# Patient Record
Sex: FEMALE | Race: WHITE | NOT HISPANIC OR LATINO | Employment: OTHER | URBAN - METROPOLITAN AREA
[De-identification: names, ages, dates, MRNs, and addresses within clinical notes are randomized per-mention and may not be internally consistent; named-entity substitution may affect disease eponyms.]

---

## 2021-06-24 ENCOUNTER — TELEPHONE (OUTPATIENT)
Dept: ENDOCRINOLOGY | Facility: CLINIC | Age: 66
End: 2021-06-24

## 2021-06-24 NOTE — TELEPHONE ENCOUNTER
----- Message from Dave Lewis MD sent at 6/23/2021  5:08 PM EDT -----  We only got the second page of the labs.  Can you contact them to get the first page?  ----- Message -----  From: Makenzie Lundberg  Sent: 6/23/2021   4:08 PM EDT  To: Dave Lewis MD

## 2021-10-01 ENCOUNTER — LAB (OUTPATIENT)
Dept: LAB | Facility: HOSPITAL | Age: 66
End: 2021-10-01

## 2021-10-01 ENCOUNTER — OFFICE VISIT (OUTPATIENT)
Dept: ENDOCRINOLOGY | Facility: CLINIC | Age: 66
End: 2021-10-01

## 2021-10-01 VITALS
OXYGEN SATURATION: 98 % | WEIGHT: 274 LBS | SYSTOLIC BLOOD PRESSURE: 146 MMHG | HEIGHT: 66 IN | HEART RATE: 84 BPM | DIASTOLIC BLOOD PRESSURE: 88 MMHG | BODY MASS INDEX: 44.03 KG/M2

## 2021-10-01 DIAGNOSIS — E03.9 HYPOTHYROIDISM (ACQUIRED): Primary | ICD-10-CM

## 2021-10-01 DIAGNOSIS — E03.9 HYPOTHYROIDISM (ACQUIRED): ICD-10-CM

## 2021-10-01 LAB — TSH SERPL DL<=0.05 MIU/L-ACNC: 4.77 UIU/ML (ref 0.27–4.2)

## 2021-10-01 PROCEDURE — 99203 OFFICE O/P NEW LOW 30 MIN: CPT | Performed by: INTERNAL MEDICINE

## 2021-10-01 PROCEDURE — 84443 ASSAY THYROID STIM HORMONE: CPT

## 2021-10-01 NOTE — PROGRESS NOTES
"     Office Note      Date: 10/01/2021  Patient Name: Fawad Calix  MRN: [id number]  : 1955    Chief Complaint   Patient presents with   • Hypothyroidism       History of Present Illness:   Fawad Calix is a 66 y.o. female who presents for Hypothyroidism    She reports h/o hypothyroidism for about 15 years.  She has been treated with synthroid.  She is currently taking 125mcg qd.  She hasn't done well on generic T4 in the past.  She is taking the synthroid correctly.  She isn't taking any interfering meds concurrently.  She notes fatigue.  She c/o weight gain.  She noted heat intolerance.  She denies any other sxs of hypo- or hyperthyroidism at this time.  She reports h/o goiter.  She hasn't noted any change in the size of her neck.  She denies any compressive sxs.  She haasn't had any labs done recently.  She denies any biotin intake.    Subjective      Patient was born where: [place].  Facial radiation exposure: No.  High iodine intake: No  Family hx of thyroid disease: Yes, describe: father and sister with hypothyroidism.    Review of Systems:   Review of Systems   Constitutional: Positive for fatigue and unexpected weight change.   Eyes: Negative.    Respiratory: Negative.    Cardiovascular: Negative.    Gastrointestinal: Positive for constipation and diarrhea.        Heartburn/Reflux   Endocrine: Positive for heat intolerance.   Genitourinary: Negative.    Musculoskeletal: Positive for myalgias.   Skin: Negative.    Allergic/Immunologic: Negative.    Neurological: Positive for headaches.   Hematological: Negative.    Psychiatric/Behavioral: Positive for dysphoric mood.       The following portions of the patient's history were reviewed and updated as appropriate: allergies, current medications, past family history, past medical history, past social history, past surgical history and problem list.    Objective     Visit Vitals  /88   Pulse 84   Ht 167.6 cm (66\")   Wt 124 kg (274 lb)   SpO2 98% "   BMI 44.22 kg/m²       Physical Exam:  Physical Exam  Constitutional:       Appearance: She is obese.   HENT:      Head: Normocephalic and atraumatic.   Eyes:      Extraocular Movements: Extraocular movements intact.      Conjunctiva/sclera: Conjunctivae normal.      Pupils: Pupils are equal, round, and reactive to light.   Neck:      Thyroid: No thyroid mass, thyromegaly or thyroid tenderness.   Cardiovascular:      Rate and Rhythm: Normal rate and regular rhythm.      Pulses: Normal pulses.      Heart sounds: Normal heart sounds.   Pulmonary:      Effort: Pulmonary effort is normal.      Breath sounds: Normal breath sounds.   Abdominal:      General: Bowel sounds are normal.      Palpations: Abdomen is soft.   Musculoskeletal:         General: Normal range of motion.      Cervical back: Normal range of motion and neck supple.   Lymphadenopathy:      Cervical: No cervical adenopathy.   Skin:     General: Skin is warm and dry.   Neurological:      General: No focal deficit present.      Mental Status: She is alert.   Psychiatric:         Mood and Affect: Mood normal.         Behavior: Behavior normal.         Thought Content: Thought content normal.         Judgment: Judgment normal.         Labs:    TSH  No results found for: TSHBASE     Free T4  Free T4   Date Value Ref Range Status   02/04/2016 1.03 0.89 - 1.76 ng/dL Final       T3  No results found for: G3WFWBI      TPO  No results found for: THYROIDAB    TG AB  No results found for: THGAB    TG  No results found for: THYROGLB    CBC w/DIFF  Lab Results   Component Value Date    WBC 5.85 02/04/2016    RBC 4.66 02/04/2016    HGB 13.8 02/04/2016    HCT 41.3 02/04/2016    MCV 88.6 02/04/2016    MCH 29.6 02/04/2016    MCHC 33.4 02/04/2016     02/04/2016    NEUTRORELPCT 62.4 02/04/2016    LYMPHORELPCT 30.9 02/04/2016    MONORELPCT 6.3 02/04/2016    EOSRELPCT 0.0 02/04/2016    BASORELPCT 0.2 02/04/2016    NEUTROABS 3.65 02/04/2016    LYMPHSABS 1.81 02/04/2016     MONOSABS 0.37 02/04/2016    EOSABS 0.00 (L) 02/04/2016    BASOSABS 0.01 02/04/2016    NRBC 0.0 02/04/2016           Assessment / Plan      Assessment & Plan:  Diagnoses and all orders for this visit:    1. Hypothyroidism (acquired) (Primary)  Assessment & Plan:  She has hypothyroidism.  She is on synthroid.  She reports not doing well with generic T4 in the past.  Check TSH today.    Orders:  -     TSH; Future       Return in about 6 months (around 4/1/2022) for Recheck.    Dave Lewis MD   10/01/2021

## 2021-10-01 NOTE — ASSESSMENT & PLAN NOTE
She has hypothyroidism.  She is on synthroid.  She reports not doing well with generic T4 in the past.  Check TSH today.

## 2021-10-03 RX ORDER — LEVOTHYROXINE SODIUM 137 MCG
137 TABLET ORAL DAILY
Qty: 90 TABLET | Refills: 3 | Status: SHIPPED | OUTPATIENT
Start: 2021-10-03 | End: 2022-01-25 | Stop reason: DRUGHIGH

## 2022-01-24 ENCOUNTER — LAB (OUTPATIENT)
Dept: LAB | Facility: HOSPITAL | Age: 67
End: 2022-01-24

## 2022-01-24 ENCOUNTER — OFFICE VISIT (OUTPATIENT)
Dept: ENDOCRINOLOGY | Facility: CLINIC | Age: 67
End: 2022-01-24

## 2022-01-24 VITALS
SYSTOLIC BLOOD PRESSURE: 138 MMHG | WEIGHT: 273 LBS | DIASTOLIC BLOOD PRESSURE: 78 MMHG | OXYGEN SATURATION: 98 % | BODY MASS INDEX: 43.87 KG/M2 | HEIGHT: 66 IN | HEART RATE: 79 BPM

## 2022-01-24 DIAGNOSIS — E03.9 HYPOTHYROIDISM (ACQUIRED): ICD-10-CM

## 2022-01-24 DIAGNOSIS — E03.9 HYPOTHYROIDISM (ACQUIRED): Primary | ICD-10-CM

## 2022-01-24 LAB — TSH SERPL DL<=0.05 MIU/L-ACNC: 9.57 UIU/ML (ref 0.27–4.2)

## 2022-01-24 PROCEDURE — 84443 ASSAY THYROID STIM HORMONE: CPT

## 2022-01-24 PROCEDURE — 99213 OFFICE O/P EST LOW 20 MIN: CPT | Performed by: INTERNAL MEDICINE

## 2022-01-24 NOTE — PROGRESS NOTES
"     Office Note      Date: 2022  Patient Name: Fawad Calix  MRN: [id number]  : 1955    Chief Complaint   Patient presents with   • Hypothyroidism       History of Present Illness:   Fawad Calix is a 66 y.o. female who presents for Hypothyroidism    She remains on Synthroid 137mcg qd.  She is taking this correctly.  She isn't taking any interfering meds concurrently.  She hasn't noted any change in the size of her neck.  She denies any compressive sxs.  She c/o fatigue but this is slightly better.  Her weight has been stable.  She denies any other sxs of hypo- or hyperthyroidism at this time.      Subjective      Review of Systems:   Review of Systems   Constitutional: Positive for fatigue.   Cardiovascular: Negative.    Gastrointestinal: Negative.    Endocrine: Negative.        The following portions of the patient's history were reviewed and updated as appropriate: allergies, current medications, past family history, past medical history, past social history, past surgical history and problem list.    Objective     Visit Vitals  /78 (BP Location: Left arm, Patient Position: Sitting, Cuff Size: Adult)   Pulse 79   Ht 167.6 cm (66\")   Wt 124 kg (273 lb)   SpO2 98%   BMI 44.06 kg/m²       Physical Exam:  Physical Exam  Constitutional:       Appearance: Normal appearance.   Neck:      Thyroid: No thyroid mass or thyroid tenderness.   Lymphadenopathy:      Cervical: No cervical adenopathy.   Neurological:      Mental Status: She is alert.         Labs:    TSH  No results found for: TSHBASE     Free T4  Free T4   Date Value Ref Range Status   2016 1.03 0.89 - 1.76 ng/dL Final       T3  No results found for: E7XEBSZ      TPO  No results found for: THYROIDAB    TG AB  No results found for: THGAB    TG  No results found for: THYROGLB    CBC w/DIFF  Lab Results   Component Value Date    WBC 5.85 2016    RBC 4.66 2016    HGB 13.8 2016    HCT 41.3 2016    MCV 88.6 " 02/04/2016    MCH 29.6 02/04/2016    MCHC 33.4 02/04/2016     02/04/2016    NEUTRORELPCT 62.4 02/04/2016    LYMPHORELPCT 30.9 02/04/2016    MONORELPCT 6.3 02/04/2016    EOSRELPCT 0.0 02/04/2016    BASORELPCT 0.2 02/04/2016    NEUTROABS 3.65 02/04/2016    LYMPHSABS 1.81 02/04/2016    MONOSABS 0.37 02/04/2016    EOSABS 0.00 (L) 02/04/2016    BASOSABS 0.01 02/04/2016    NRBC 0.0 02/04/2016           Assessment / Plan      Assessment & Plan:  Diagnoses and all orders for this visit:    1. Hypothyroidism (acquired) (Primary)  Assessment & Plan:  Continue Synthroid.  Check TSH today.  Will send note about results.    Orders:  -     TSH; Future      Return in about 3 months (around 4/24/2022) for Recheck with TSH.    Dave Lewis MD   01/24/2022

## 2022-01-25 RX ORDER — LEVOTHYROXINE SODIUM 150 MCG
150 TABLET ORAL DAILY
Qty: 90 TABLET | Refills: 3 | Status: SHIPPED | OUTPATIENT
Start: 2022-01-25 | End: 2022-06-10 | Stop reason: DRUGHIGH

## 2022-06-09 ENCOUNTER — LAB (OUTPATIENT)
Dept: LAB | Facility: HOSPITAL | Age: 67
End: 2022-06-09

## 2022-06-09 ENCOUNTER — OFFICE VISIT (OUTPATIENT)
Dept: ENDOCRINOLOGY | Facility: CLINIC | Age: 67
End: 2022-06-09

## 2022-06-09 VITALS
HEIGHT: 66 IN | DIASTOLIC BLOOD PRESSURE: 70 MMHG | WEIGHT: 272 LBS | HEART RATE: 65 BPM | BODY MASS INDEX: 43.71 KG/M2 | OXYGEN SATURATION: 98 % | SYSTOLIC BLOOD PRESSURE: 136 MMHG

## 2022-06-09 DIAGNOSIS — E03.9 HYPOTHYROIDISM (ACQUIRED): ICD-10-CM

## 2022-06-09 DIAGNOSIS — E03.9 HYPOTHYROIDISM (ACQUIRED): Primary | ICD-10-CM

## 2022-06-09 PROCEDURE — 99213 OFFICE O/P EST LOW 20 MIN: CPT | Performed by: INTERNAL MEDICINE

## 2022-06-09 PROCEDURE — 84443 ASSAY THYROID STIM HORMONE: CPT

## 2022-06-09 NOTE — PROGRESS NOTES
"     Office Note      Date: 2022  Patient Name: Fawad Calix  MRN: [id number]  : 1955    Chief Complaint   Patient presents with   • Hypothyroidism       History of Present Illness:   Fawad Calix is a 66 y.o. female who presents for Hypothyroidism    She remains on Synthroid 150mcg qd.  She is taking this correctly.  She isn't taking any interfering meds concurrently.  She hasn't noted any change in the size of her neck.  She denies any compressive sxs.  She c/o fatigue but this is slightly better.  Her weight has been stable.  She denies any other sxs of hypo- or hyperthyroidism at this time.      Subjective      Review of Systems:   Review of Systems   Constitutional: Positive for fatigue.   Cardiovascular: Negative.    Gastrointestinal: Negative.    Endocrine: Negative.        The following portions of the patient's history were reviewed and updated as appropriate: allergies, current medications, past family history, past medical history, past social history, past surgical history and problem list.    Objective     Visit Vitals  /70   Pulse 65   Ht 167.6 cm (66\")   Wt 123 kg (272 lb)   SpO2 98%   BMI 43.90 kg/m²       Physical Exam:  Physical Exam  Constitutional:       Appearance: Normal appearance.   Neck:      Thyroid: No thyroid mass, thyromegaly or thyroid tenderness.   Lymphadenopathy:      Cervical: No cervical adenopathy.   Neurological:      Mental Status: She is alert.         Labs:    TSH  No results found for: TSHBASE     Free T4  Free T4   Date Value Ref Range Status   2016 1.03 0.89 - 1.76 ng/dL Final       T3  No results found for: A3HTPWY      TPO  No results found for: THYROIDAB    TG AB  No results found for: THGAB    TG  No results found for: THYROGLB    CBC w/DIFF  Lab Results   Component Value Date    WBC 5.85 2016    RBC 4.66 2016    HGB 13.8 2016    HCT 41.3 2016    MCV 88.6 2016    MCH 29.6 2016    MCHC 33.4 2016 "     02/04/2016    NEUTRORELPCT 62.4 02/04/2016    LYMPHORELPCT 30.9 02/04/2016    MONORELPCT 6.3 02/04/2016    EOSRELPCT 0.0 02/04/2016    BASORELPCT 0.2 02/04/2016    NEUTROABS 3.65 02/04/2016    LYMPHSABS 1.81 02/04/2016    MONOSABS 0.37 02/04/2016    EOSABS 0.00 (L) 02/04/2016    BASOSABS 0.01 02/04/2016    NRBC 0.0 02/04/2016           Assessment / Plan      Assessment & Plan:  Diagnoses and all orders for this visit:    1. Hypothyroidism (acquired) (Primary)  Assessment & Plan:  Continue T4 tx.  Check TSH today.    Orders:  -     TSH; Future      Return in about 3 months (around 9/9/2022) for Recheck with TSH.    Dave Lewis MD   06/09/2022

## 2022-06-10 LAB — TSH SERPL DL<=0.05 MIU/L-ACNC: 9.08 UIU/ML (ref 0.27–4.2)

## 2022-06-10 RX ORDER — LEVOTHYROXINE SODIUM 175 MCG
175 TABLET ORAL DAILY
Qty: 90 TABLET | Refills: 3 | Status: SHIPPED | OUTPATIENT
Start: 2022-06-10 | End: 2023-01-17 | Stop reason: SDUPTHER

## 2022-12-05 ENCOUNTER — OFFICE VISIT (OUTPATIENT)
Dept: ENDOCRINOLOGY | Facility: CLINIC | Age: 67
End: 2022-12-05

## 2022-12-05 ENCOUNTER — LAB (OUTPATIENT)
Dept: LAB | Facility: HOSPITAL | Age: 67
End: 2022-12-05

## 2022-12-05 VITALS
HEART RATE: 77 BPM | SYSTOLIC BLOOD PRESSURE: 128 MMHG | HEIGHT: 66 IN | WEIGHT: 268 LBS | OXYGEN SATURATION: 97 % | BODY MASS INDEX: 43.07 KG/M2 | DIASTOLIC BLOOD PRESSURE: 70 MMHG

## 2022-12-05 DIAGNOSIS — E03.9 HYPOTHYROIDISM (ACQUIRED): Primary | ICD-10-CM

## 2022-12-05 LAB — TSH SERPL DL<=0.05 MIU/L-ACNC: 1.42 UIU/ML (ref 0.27–4.2)

## 2022-12-05 PROCEDURE — 99213 OFFICE O/P EST LOW 20 MIN: CPT | Performed by: INTERNAL MEDICINE

## 2022-12-05 PROCEDURE — 84443 ASSAY THYROID STIM HORMONE: CPT | Performed by: INTERNAL MEDICINE

## 2022-12-05 NOTE — PROGRESS NOTES
"     Office Note      Date: 2022  Patient Name: Fawad Calix  MRN: [id number]  : 1955    Chief Complaint   Patient presents with   • Hypothyroidism       History of Present Illness:   Fawad Calix is a 67 y.o. female who presents for Hypothyroidism    She remains on Synthroid 175mcg qd.  She is taking this correctly.  She isn't taking any interfering meds concurrently.  She hasn't noted any change in the size of her neck.  She denies any compressive sxs.  Her energy level is much better.  Her weight has decreased some.  She notes dry skin.  She denies any other sxs of hypo- or hyperthyroidism at this time.      Subjective      Review of Systems:   Review of Systems   Constitutional: Negative.    Cardiovascular: Negative.    Gastrointestinal: Negative.    Endocrine: Negative.        The following portions of the patient's history were reviewed and updated as appropriate: allergies, current medications, past family history, past medical history, past social history, past surgical history and problem list.    Objective     Visit Vitals  /70   Pulse 77   Ht 167.6 cm (66\")   Wt 122 kg (268 lb)   SpO2 97%   BMI 43.26 kg/m²       Physical Exam:  Physical Exam  Constitutional:       Appearance: She is obese.   Neck:      Thyroid: No thyroid mass, thyromegaly or thyroid tenderness.   Lymphadenopathy:      Cervical: No cervical adenopathy.   Neurological:      Mental Status: She is alert.         Labs:    TSH  No results found for: TSHBASE     Free T4  Free T4   Date Value Ref Range Status   2016 1.03 0.89 - 1.76 ng/dL Final       T3  No results found for: I2PANRN      TPO  No results found for: THYROIDAB    TG AB  No results found for: THGAB    TG  No results found for: THYROGLB    CBC w/DIFF  Lab Results   Component Value Date    WBC 5.85 2016    RBC 4.66 2016    HGB 13.8 2016    HCT 41.3 2016    MCV 88.6 2016    MCH 29.6 2016    MCHC 33.4 2016    "  02/04/2016    NEUTRORELPCT 62.4 02/04/2016    LYMPHORELPCT 30.9 02/04/2016    MONORELPCT 6.3 02/04/2016    EOSRELPCT 0.0 02/04/2016    BASORELPCT 0.2 02/04/2016    NEUTROABS 3.65 02/04/2016    LYMPHSABS 1.81 02/04/2016    MONOSABS 0.37 02/04/2016    EOSABS 0.00 (L) 02/04/2016    BASOSABS 0.01 02/04/2016    NRBC 0.0 02/04/2016           Assessment / Plan      Assessment & Plan:  Diagnoses and all orders for this visit:    1. Hypothyroidism (acquired) (Primary)  Assessment & Plan:  Continue Synthroid.  Check TSH today.  Will send note about results.    Orders:  -     TSH; Future      Return in about 6 months (around 6/5/2023) for Recheck with TSH.    Dave Lewis MD   12/05/2022

## 2023-01-17 RX ORDER — LEVOTHYROXINE SODIUM 175 MCG
175 TABLET ORAL DAILY
Qty: 90 TABLET | Refills: 3 | Status: SHIPPED | OUTPATIENT
Start: 2023-01-17

## 2023-06-08 ENCOUNTER — OFFICE VISIT (OUTPATIENT)
Dept: ENDOCRINOLOGY | Facility: CLINIC | Age: 68
End: 2023-06-08
Payer: MEDICARE

## 2023-06-08 VITALS
HEIGHT: 66 IN | HEART RATE: 76 BPM | DIASTOLIC BLOOD PRESSURE: 84 MMHG | BODY MASS INDEX: 44.84 KG/M2 | WEIGHT: 279 LBS | SYSTOLIC BLOOD PRESSURE: 138 MMHG

## 2023-06-08 DIAGNOSIS — E03.9 HYPOTHYROIDISM (ACQUIRED): Primary | ICD-10-CM

## 2023-06-08 LAB — TSH SERPL DL<=0.05 MIU/L-ACNC: 3.42 UIU/ML (ref 0.27–4.2)

## 2023-06-08 PROCEDURE — 84443 ASSAY THYROID STIM HORMONE: CPT | Performed by: INTERNAL MEDICINE

## 2023-06-08 PROCEDURE — 99213 OFFICE O/P EST LOW 20 MIN: CPT | Performed by: INTERNAL MEDICINE

## 2023-06-08 PROCEDURE — 1159F MED LIST DOCD IN RCRD: CPT | Performed by: INTERNAL MEDICINE

## 2023-06-08 PROCEDURE — 1160F RVW MEDS BY RX/DR IN RCRD: CPT | Performed by: INTERNAL MEDICINE

## 2023-06-08 PROCEDURE — 36415 COLL VENOUS BLD VENIPUNCTURE: CPT | Performed by: INTERNAL MEDICINE

## 2023-06-08 NOTE — PROGRESS NOTES
"     Office Note      Date: 2023  Patient Name: Fawad Calix  MRN: [id number]  : 1955    Chief Complaint   Patient presents with    Hypothyroidism       History of Present Illness:   Fawad Calix is a 67 y.o. female who presents for Hypothyroidism    She remains on Synthroid 175mcg qd.  She is taking this correctly.  She isn't taking any interfering meds concurrently.  She hasn't noted any change in the size of her neck.  She denies any compressive sxs.  She notes fatigue.  She notes more constipation.  She notes dry skin.  She denies any other sxs of hypo- or hyperthyroidism at this time.       Subjective      Review of Systems:   Review of Systems   Constitutional:  Positive for fatigue.   Cardiovascular: Negative.    Gastrointestinal:  Positive for constipation.   Endocrine: Negative.      The following portions of the patient's history were reviewed and updated as appropriate: allergies, current medications, past family history, past medical history, past social history, past surgical history, and problem list.    Objective     Visit Vitals  /84 (BP Location: Left arm, Patient Position: Sitting, Cuff Size: Adult)   Pulse 76   Ht 167.6 cm (65.98\")   Wt 127 kg (279 lb)   BMI 45.05 kg/m²       Physical Exam:  Physical Exam  Constitutional:       Appearance: Normal appearance.   Neck:      Thyroid: No thyroid mass, thyromegaly or thyroid tenderness.   Lymphadenopathy:      Cervical: No cervical adenopathy.   Neurological:      Mental Status: She is alert.       Labs:    TSH  No results found for: TSHBASE     Free T4  Free T4   Date Value Ref Range Status   2016 1.03 0.89 - 1.76 ng/dL Final       T3  No results found for: [id number]      TPO  No results found for: THYROIDAB    TG AB  No results found for: THGAB    TG  No results found for: THYROGLB    CBC w/DIFF  Lab Results   Component Value Date    WBC 5.85 2016    RBC 4.66 2016    HGB 13.8 2016    HCT 41.3 2016 "    MCV 88.6 02/04/2016    MCH 29.6 02/04/2016    MCHC 33.4 02/04/2016     02/04/2016    NEUTRORELPCT 62.4 02/04/2016    LYMPHORELPCT 30.9 02/04/2016    MONORELPCT 6.3 02/04/2016    EOSRELPCT 0.0 02/04/2016    BASORELPCT 0.2 02/04/2016    NEUTROABS 3.65 02/04/2016    LYMPHSABS 1.81 02/04/2016    MONOSABS 0.37 02/04/2016    EOSABS 0.00 (L) 02/04/2016    BASOSABS 0.01 02/04/2016    NRBC 0.0 02/04/2016           Assessment / Plan      Assessment & Plan:  Diagnoses and all orders for this visit:    1. Hypothyroidism (acquired) (Primary)  Assessment & Plan:  Having some more symptoms at this time.  Continue synthroid.  Check TSH today.      Orders:  -     TSH; Future      Current Outpatient Medications   Medication Instructions    Synthroid 175 mcg, Oral, Daily      Return in about 6 months (around 12/8/2023) for Recheck with TSH.    Dave Lewis MD   06/08/2023

## 2023-06-09 DIAGNOSIS — E03.9 HYPOTHYROIDISM (ACQUIRED): Primary | ICD-10-CM

## 2023-06-09 RX ORDER — LEVOTHYROXINE SODIUM 200 MCG
200 TABLET ORAL DAILY
Qty: 90 TABLET | Refills: 3 | Status: SHIPPED | OUTPATIENT
Start: 2023-06-09

## 2024-03-20 ENCOUNTER — OFFICE VISIT (OUTPATIENT)
Dept: ENDOCRINOLOGY | Facility: CLINIC | Age: 69
End: 2024-03-20
Payer: MEDICARE

## 2024-03-20 VITALS
HEIGHT: 66 IN | SYSTOLIC BLOOD PRESSURE: 142 MMHG | HEART RATE: 75 BPM | WEIGHT: 278 LBS | OXYGEN SATURATION: 98 % | BODY MASS INDEX: 44.68 KG/M2 | DIASTOLIC BLOOD PRESSURE: 88 MMHG

## 2024-03-20 DIAGNOSIS — E03.9 HYPOTHYROIDISM (ACQUIRED): Primary | ICD-10-CM

## 2024-03-20 LAB — TSH SERPL DL<=0.05 MIU/L-ACNC: 5.04 UIU/ML (ref 0.27–4.2)

## 2024-03-20 PROCEDURE — 84443 ASSAY THYROID STIM HORMONE: CPT | Performed by: INTERNAL MEDICINE

## 2024-03-20 PROCEDURE — 1159F MED LIST DOCD IN RCRD: CPT | Performed by: INTERNAL MEDICINE

## 2024-03-20 PROCEDURE — 99213 OFFICE O/P EST LOW 20 MIN: CPT | Performed by: INTERNAL MEDICINE

## 2024-03-20 PROCEDURE — 1160F RVW MEDS BY RX/DR IN RCRD: CPT | Performed by: INTERNAL MEDICINE

## 2024-03-20 PROCEDURE — 36415 COLL VENOUS BLD VENIPUNCTURE: CPT | Performed by: INTERNAL MEDICINE

## 2024-03-20 RX ORDER — LEVOTHYROXINE SODIUM 112 MCG
224 TABLET ORAL DAILY
Qty: 180 TABLET | Refills: 3 | Status: SHIPPED | OUTPATIENT
Start: 2024-03-20 | End: 2024-03-21 | Stop reason: SDUPTHER

## 2024-03-20 RX ORDER — LEVOTHYROXINE SODIUM 200 MCG
200 TABLET ORAL DAILY
Qty: 90 TABLET | Refills: 3 | Status: SHIPPED | OUTPATIENT
Start: 2024-03-20 | End: 2024-03-20 | Stop reason: DRUGHIGH

## 2024-03-20 NOTE — PROGRESS NOTES
"     Office Note      Date: 2024  Patient Name: Fawad Calix  MRN: [id number]  : 1955    Chief Complaint   Patient presents with    Hypothyroidism       History of Present Illness:   Fawad Calix is a 68 y.o. female who presents for Hypothyroidism    She remains on Synthroid 200mcg qd.  She is taking this correctly.  She isn't taking any interfering meds concurrently.  She hasn't noted any change in the size of her neck.  She denies any compressive sxs.  She denies any fatigue.  She notes improvement in constipation.  She notes dry skin.  She denies any other sxs of hypo- or hyperthyroidism at this time.        Subjective      Review of Systems:   Review of Systems   Constitutional: Negative.    Cardiovascular: Negative.    Gastrointestinal:  Positive for constipation.   Endocrine: Negative.        The following portions of the patient's history were reviewed and updated as appropriate: allergies, current medications, past family history, past medical history, past social history, past surgical history, and problem list.    Objective     Visit Vitals  /88 (BP Location: Left arm, Patient Position: Sitting, Cuff Size: Adult)   Pulse 75   Ht 167.4 cm (65.9\")   Wt 126 kg (278 lb)   SpO2 98%   BMI 45.01 kg/m²       Physical Exam:  Physical Exam  Constitutional:       Appearance: Normal appearance.   Neck:      Thyroid: No thyroid mass, thyromegaly or thyroid tenderness.   Lymphadenopathy:      Cervical: No cervical adenopathy.   Neurological:      Mental Status: She is alert.         Labs:    TSH  No results found for: \"TSHBASE\"     Free T4  Free T4   Date Value Ref Range Status   2016 1.03 0.89 - 1.76 ng/dL Final       T3  No results found for: \"F6YODJJ\"      TPO  No results found for: \"THYROIDAB\"    TG AB  No results found for: \"THGAB\"    TG  No results found for: \"THYROGLB\"    CBC w/DIFF  Lab Results   Component Value Date    WBC 5.85 2016    RBC 4.66 2016    HGB 13.8 " 02/04/2016    HCT 41.3 02/04/2016    MCV 88.6 02/04/2016    MCH 29.6 02/04/2016    MCHC 33.4 02/04/2016     02/04/2016    NEUTRORELPCT 62.4 02/04/2016    LYMPHORELPCT 30.9 02/04/2016    MONORELPCT 6.3 02/04/2016    EOSRELPCT 0.0 02/04/2016    BASORELPCT 0.2 02/04/2016    NEUTROABS 3.65 02/04/2016    LYMPHSABS 1.81 02/04/2016    MONOSABS 0.37 02/04/2016    EOSABS 0.00 (L) 02/04/2016    BASOSABS 0.01 02/04/2016    NRBC 0.0 02/04/2016           Assessment / Plan      Assessment & Plan:  Diagnoses and all orders for this visit:    1. Hypothyroidism (acquired) (Primary)  Assessment & Plan:  Continue Synthroid.  Check TSH.    Orders:  -     TSH; Future      Current Outpatient Medications   Medication Instructions    Synthroid 200 mcg, Oral, Daily      Return in about 6 months (around 9/20/2024) for Recheck with TSH.    Electronically signed by: Dave Lewis MD  03/20/2024

## 2024-03-21 ENCOUNTER — TELEPHONE (OUTPATIENT)
Dept: ENDOCRINOLOGY | Facility: CLINIC | Age: 69
End: 2024-03-21

## 2024-03-21 ENCOUNTER — TELEPHONE (OUTPATIENT)
Dept: ENDOCRINOLOGY | Facility: CLINIC | Age: 69
End: 2024-03-21
Payer: MEDICARE

## 2024-03-21 RX ORDER — LEVOTHYROXINE SODIUM 112 MCG
224 TABLET ORAL DAILY
Qty: 180 TABLET | Refills: 3 | Status: SHIPPED | OUTPATIENT
Start: 2024-03-21

## 2024-03-21 RX ORDER — LEVOTHYROXINE SODIUM 112 MCG
224 TABLET ORAL DAILY
Qty: 180 TABLET | Refills: 3 | Status: CANCELLED | OUTPATIENT
Start: 2024-03-21

## 2024-03-21 NOTE — TELEPHONE ENCOUNTER
Caller: Fawad Calix    Relationship: Self    Best call back number: [phone]    Which medication are you concerned about: Synthroid 112 MCG tablet     Who prescribed you this medication: DR CHINO    What are your concerns: PT STATES SHE NEEDS THE PRESCRIPTION SENT TO [id number] - [place], [place] - [address] - [phone]  - [phone]  [address] [address]   THE PRESCRIPTION WAS SENT TO [facility] BY [facility]. PT HAS ALREADY CANCELED THE PRESCRIPTION WITH [facility]. PLEASE CONTACT PT WITH ANY QUESTIONS OR CONCERNS.

## 2024-09-17 ENCOUNTER — OFFICE VISIT (OUTPATIENT)
Dept: ENDOCRINOLOGY | Facility: CLINIC | Age: 69
End: 2024-09-17
Payer: MEDICARE

## 2024-09-17 VITALS
BODY MASS INDEX: 44.03 KG/M2 | HEART RATE: 70 BPM | DIASTOLIC BLOOD PRESSURE: 90 MMHG | HEIGHT: 66 IN | OXYGEN SATURATION: 98 % | SYSTOLIC BLOOD PRESSURE: 140 MMHG | WEIGHT: 274 LBS

## 2024-09-17 DIAGNOSIS — E03.9 HYPOTHYROIDISM (ACQUIRED): Primary | ICD-10-CM

## 2024-09-17 LAB — TSH SERPL DL<=0.05 MIU/L-ACNC: 0.03 UIU/ML (ref 0.27–4.2)

## 2024-09-17 PROCEDURE — 1160F RVW MEDS BY RX/DR IN RCRD: CPT | Performed by: INTERNAL MEDICINE

## 2024-09-17 PROCEDURE — 36415 COLL VENOUS BLD VENIPUNCTURE: CPT | Performed by: INTERNAL MEDICINE

## 2024-09-17 PROCEDURE — 84443 ASSAY THYROID STIM HORMONE: CPT | Performed by: INTERNAL MEDICINE

## 2024-09-17 PROCEDURE — 1159F MED LIST DOCD IN RCRD: CPT | Performed by: INTERNAL MEDICINE

## 2024-09-17 PROCEDURE — 99213 OFFICE O/P EST LOW 20 MIN: CPT | Performed by: INTERNAL MEDICINE

## 2024-09-17 RX ORDER — LEVOTHYROXINE SODIUM 100 MCG
100 TABLET ORAL DAILY
Qty: 90 TABLET | Refills: 3 | Status: SHIPPED | OUTPATIENT
Start: 2024-09-17

## 2024-09-17 RX ORDER — LEVOTHYROXINE SODIUM 112 MCG
112 TABLET ORAL DAILY
Qty: 90 TABLET | Refills: 3 | Status: SHIPPED | OUTPATIENT
Start: 2024-09-17

## 2024-10-31 ENCOUNTER — LAB (OUTPATIENT)
Dept: ENDOCRINOLOGY | Facility: CLINIC | Age: 69
End: 2024-10-31
Payer: MEDICARE

## 2024-10-31 DIAGNOSIS — E03.9 HYPOTHYROIDISM (ACQUIRED): ICD-10-CM

## 2024-10-31 LAB — TSH SERPL DL<=0.05 MIU/L-ACNC: 0.05 UIU/ML (ref 0.27–4.2)

## 2024-10-31 PROCEDURE — 36415 COLL VENOUS BLD VENIPUNCTURE: CPT | Performed by: INTERNAL MEDICINE

## 2024-10-31 PROCEDURE — 84443 ASSAY THYROID STIM HORMONE: CPT | Performed by: INTERNAL MEDICINE

## 2024-11-01 DIAGNOSIS — E03.9 HYPOTHYROIDISM (ACQUIRED): Primary | ICD-10-CM

## 2024-11-01 RX ORDER — LEVOTHYROXINE SODIUM 200 MCG
200 TABLET ORAL DAILY
Qty: 90 TABLET | Refills: 3 | Status: SHIPPED | OUTPATIENT
Start: 2024-11-01

## 2025-04-08 ENCOUNTER — OFFICE VISIT (OUTPATIENT)
Dept: ENDOCRINOLOGY | Facility: CLINIC | Age: 70
End: 2025-04-08
Payer: MEDICARE

## 2025-04-08 ENCOUNTER — RESULTS FOLLOW-UP (OUTPATIENT)
Dept: ENDOCRINOLOGY | Facility: CLINIC | Age: 70
End: 2025-04-08

## 2025-04-08 VITALS
OXYGEN SATURATION: 97 % | BODY MASS INDEX: 43.97 KG/M2 | HEIGHT: 66 IN | DIASTOLIC BLOOD PRESSURE: 82 MMHG | HEART RATE: 64 BPM | WEIGHT: 273.6 LBS | SYSTOLIC BLOOD PRESSURE: 126 MMHG

## 2025-04-08 DIAGNOSIS — E03.9 HYPOTHYROIDISM (ACQUIRED): Primary | ICD-10-CM

## 2025-04-08 LAB — TSH SERPL DL<=0.05 MIU/L-ACNC: 5.56 UIU/ML (ref 0.27–4.2)

## 2025-04-08 PROCEDURE — 99213 OFFICE O/P EST LOW 20 MIN: CPT | Performed by: INTERNAL MEDICINE

## 2025-04-08 PROCEDURE — 1160F RVW MEDS BY RX/DR IN RCRD: CPT | Performed by: INTERNAL MEDICINE

## 2025-04-08 PROCEDURE — 84443 ASSAY THYROID STIM HORMONE: CPT | Performed by: INTERNAL MEDICINE

## 2025-04-08 PROCEDURE — 36415 COLL VENOUS BLD VENIPUNCTURE: CPT | Performed by: INTERNAL MEDICINE

## 2025-04-08 PROCEDURE — 1159F MED LIST DOCD IN RCRD: CPT | Performed by: INTERNAL MEDICINE

## 2025-04-08 RX ORDER — LEVOTHYROXINE SODIUM 100 MCG
100 TABLET ORAL
Qty: 90 TABLET | Refills: 3 | Status: SHIPPED | OUTPATIENT
Start: 2025-04-08

## 2025-04-08 RX ORDER — LEVOTHYROXINE SODIUM 112 MCG
112 TABLET ORAL
Qty: 90 TABLET | Refills: 3 | Status: SHIPPED | OUTPATIENT
Start: 2025-04-08

## 2025-04-08 NOTE — PROGRESS NOTES
"     Office Note      Date: 2025  Patient Name: Fawad Calix  MRN: [id number]  : 1955    Chief Complaint   Patient presents with    Hypothyroidism       History of Present Illness:   Fawad Calix is a 69 y.o. female who presents for Hypothyroidism    She remains on Synthroid 200mcg qd.  She is taking this correctly.  She isn't taking any interfering meds concurrently.  She hasn't noted any change in the size of her neck.  She denies any compressive sxs.  She denies any fatigue.  She notes resolution of constipation.  She denies any other sxs of hypo- or hyperthyroidism at this time.        Subjective      Review of Systems:   Review of Systems   Constitutional: Negative.    Cardiovascular: Negative.    Gastrointestinal: Negative.    Endocrine: Negative.        The following portions of the patient's history were reviewed and updated as appropriate: allergies, current medications, past family history, past medical history, past social history, past surgical history, and problem list.    Objective     Visit Vitals  /82 (BP Location: Left arm, Patient Position: Sitting, Cuff Size: Adult)   Pulse 64   Ht 167.4 cm (65.91\")   Wt 124 kg (273 lb 9.6 oz)   SpO2 97%   BMI 44.29 kg/m²       Physical Exam:  Physical Exam  Constitutional:       Appearance: Normal appearance.   Neck:      Thyroid: No thyroid mass, thyromegaly or thyroid tenderness.   Lymphadenopathy:      Cervical: No cervical adenopathy.   Neurological:      Mental Status: She is alert.         Labs:    TSH  No results found for: \"TSHBASE\"     Free T4  Free T4   Date Value Ref Range Status   2016 1.03 0.89 - 1.76 ng/dL Final       T3  No results found for: \"J0PFHSA\"      TPO  No results found for: \"THYROIDAB\"    TG AB  No results found for: \"THGAB\"    TG  No results found for: \"THYROGLB\"    CBC w/DIFF  Lab Results   Component Value Date    WBC 5.85 2016    RBC 4.66 2016    HGB 13.8 2016    HCT 41.3 2016    " MCV 88.6 02/04/2016    MCH 29.6 02/04/2016    MCHC 33.4 02/04/2016     02/04/2016    NEUTRORELPCT 62.4 02/04/2016    LYMPHORELPCT 30.9 02/04/2016    MONORELPCT 6.3 02/04/2016    EOSRELPCT 0.0 02/04/2016    BASORELPCT 0.2 02/04/2016    NEUTROABS 3.65 02/04/2016    LYMPHSABS 1.81 02/04/2016    MONOSABS 0.37 02/04/2016    EOSABS 0.00 (L) 02/04/2016    BASOSABS 0.01 02/04/2016    NRBC 0.0 02/04/2016           Assessment / Plan      Assessment & Plan:  Diagnoses and all orders for this visit:    1. Hypothyroidism (acquired) (Primary)  Assessment & Plan:  Continue Synthroid.  Check TSH.        Current Outpatient Medications   Medication Instructions    Synthroid 200 mcg, Oral, Daily      Return in about 6 months (around 10/8/2025) for Recheck with TSH.    Electronically signed by: Dave Lewis MD  04/08/2025